# Patient Record
Sex: MALE | Race: AMERICAN INDIAN OR ALASKA NATIVE | Employment: UNEMPLOYED | URBAN - METROPOLITAN AREA
[De-identification: names, ages, dates, MRNs, and addresses within clinical notes are randomized per-mention and may not be internally consistent; named-entity substitution may affect disease eponyms.]

---

## 2021-01-24 ENCOUNTER — TRANSFERRED RECORDS (OUTPATIENT)
Dept: HEALTH INFORMATION MANAGEMENT | Facility: CLINIC | Age: 21
End: 2021-01-24

## 2021-01-25 ENCOUNTER — HOSPITAL ENCOUNTER (INPATIENT)
Facility: HOSPITAL | Age: 21
LOS: 3 days | Discharge: HOME | End: 2021-01-28
Attending: PSYCHIATRY & NEUROLOGY | Admitting: PSYCHIATRY & NEUROLOGY
Payer: MEDICAID

## 2021-01-25 ENCOUNTER — TELEPHONE (OUTPATIENT)
Dept: BEHAVIORAL HEALTH | Facility: CLINIC | Age: 21
End: 2021-01-25

## 2021-01-25 PROBLEM — T14.91XA SUICIDE ATTEMPT (H): Status: ACTIVE | Noted: 2021-01-25

## 2021-01-25 PROCEDURE — 124N000001 HC R&B MH

## 2021-01-25 RX ORDER — MAGNESIUM HYDROXIDE/ALUMINUM HYDROXICE/SIMETHICONE 120; 1200; 1200 MG/30ML; MG/30ML; MG/30ML
30 SUSPENSION ORAL EVERY 4 HOURS PRN
Status: DISCONTINUED | OUTPATIENT
Start: 2021-01-25 | End: 2021-01-26

## 2021-01-25 RX ORDER — ACETAMINOPHEN 325 MG/1
650 TABLET ORAL EVERY 4 HOURS PRN
Status: DISCONTINUED | OUTPATIENT
Start: 2021-01-25 | End: 2021-01-28 | Stop reason: HOSPADM

## 2021-01-25 RX ORDER — POLYETHYLENE GLYCOL 3350 17 G/17G
17 POWDER, FOR SOLUTION ORAL DAILY PRN
Status: DISCONTINUED | OUTPATIENT
Start: 2021-01-25 | End: 2021-01-28 | Stop reason: HOSPADM

## 2021-01-25 RX ORDER — ARIPIPRAZOLE 5 MG/1
5 TABLET ORAL DAILY
Status: ON HOLD | COMMUNITY
Start: 2021-01-04 | End: 2021-01-28

## 2021-01-25 ASSESSMENT — ACTIVITIES OF DAILY LIVING (ADL)
ORAL_HYGIENE: INDEPENDENT
DRESS: SCRUBS (BEHAVIORAL HEALTH)
HYGIENE/GROOMING: INDEPENDENT

## 2021-01-25 ASSESSMENT — MIFFLIN-ST. JEOR: SCORE: 1685.76

## 2021-01-25 NOTE — TELEPHONE ENCOUNTER
"S: 21 y/o male presented to the [facility] ED after a suicide attempt.    B: Hx MDD, anxiety, SIB.  Pt ingested a \"handful\" of Prozac and Abilify as a suicide attempt.  Hx of IP MH admission x2 at [facility] in [place] due to suicide attempts.  Last suicide attempt sometime within the past 6 months.   Pt reported auditory hallucinations but their nature is unclear.   reported pt became irritable when he was asked to elaborate.  No reported HI or recent cutting.  Admits to using marijuana but denied other substances.  Per assessment, pt's guardian/foster mother lives in [place].  Pt recently moved to the [facility] to live with his (bio) mother who has kicked him out only to ask him to return on multiple occasions.      A: 72 hr hold.  DEC Assessment states pt has a legal guardian/foster mother.  Hillary Miranda.   reported pt has been medically cleared.  Poison Control recommended IV fluids and observation.  Labs have not been received.    R: DEC Assessment received at 0412.  Awaiting additional clinical, hold and labs from [facility].  "

## 2021-01-25 NOTE — TELEPHONE ENCOUNTER
"S: 21 y/o male presented to the [facility] ED after a suicide attempt.  B: Hx MDD, anxiety, SIB.  Pt ingested a \"handful\" of Prozac and Abilify as a suicide attempt.  Hx of IP MH admission x2 at [facility] in [place] due to suicide attempts.  Last suicide attempt sometime within the past 6 months.   Pt reported auditory hallucinations but their nature is unclear.   reported pt became irritable when he was asked to elaborate.  No reported HI or recent cutting.  Admits to using marijuana but denied other substances.  Per assessment, pt's guardian/foster mother lives in [place].  Pt recently moved to the [facility] to live with his (bio) mother who has kicked him out only to ask him to return on multiple occasions.    A: 72 hr hold.  DEC Assessment states pt has a legal guardian/foster mother.  Hillary Miranda.  Patient cleared and ready for behavioral bed placement: Yes   reported pt has been medically cleared.  Poison Control cleared.  Covid negative.  R:  [place] [facility] 5N/Cali Alanis/Branden  "

## 2021-01-26 PROCEDURE — 124N000001 HC R&B MH

## 2021-01-26 PROCEDURE — 99222 1ST HOSP IP/OBS MODERATE 55: CPT | Mod: 95 | Performed by: NURSE PRACTITIONER

## 2021-01-26 PROCEDURE — 250N000013 HC RX MED GY IP 250 OP 250 PS 637: Performed by: NURSE PRACTITIONER

## 2021-01-26 RX ADMIN — BICTEGRAVIR SODIUM, EMTRICITABINE, AND TENOFOVIR ALAFENAMIDE FUMARATE 1 TABLET: 50; 200; 25 TABLET ORAL at 11:28

## 2021-01-26 ASSESSMENT — ACTIVITIES OF DAILY LIVING (ADL)
HYGIENE/GROOMING: INDEPENDENT
DRESS: SCRUBS (BEHAVIORAL HEALTH)
ORAL_HYGIENE: INDEPENDENT

## 2021-01-26 NOTE — PROGRESS NOTES
01/25/21 2108   Patient Belongings   Did you bring any home meds/supplements to the hospital?  No   Patient Belongings locker;sent to security per site process   Patient Belongings Put in Hospital Secure Location (Security or Locker, etc.) cash/credit card;cell phone/electronics;clothing;wallet;shoes   Belongings Search Yes   Clothing Search Yes   Second Staff princess Drake jeans, white shirt, black jacket, black shoes, [place] ID, 2 mastercard debit, 2 visa debit, social security card,[place] ID, orange scrubs, black samsung cell phone   List items sent to safe: Samsung cell phone no cracks, social security card, [place] ID, 2 mastercard debit, 2 visa debit, [place] ID    All other belongings put in assigned cubby in belongings room.       I have reviewed my belongings list on admission and verify that it is correct.     Patient signature_______________________________    Second staff witness (if patient unable to sign) ______________________________       I have received all my belongings at discharge.    Patient signature________________________________    Yina  1/25/2021  9:10 PM

## 2021-01-26 NOTE — H&P
"Psychiatric Eval/H&P  Patient Name: Rajiv Covington   YOB: 2000  Age: 20 year old  [id number]    Primary Physician: No Ref-Primary, Physician   Completed By: Cali Alanis NP     CC:  Suicide attempt    HPI  Rajiv Covington is a 20 year old who was admitted via [facility] ED after ingesting a handful of Prozac and Abilify with the intent to commit suicide. Previous history of two inpatient admissions for suicide attempt at [facility] in [place]. Also reported auditory hallucinations and presented as irritable and agitated at times.  It appears he may have a guardian, his foster mother, who resides in [place]. Per ED report, Recently moved to the [facility] to reside with his biological mother, who has kicked him out multiple times, only to then ask him to return.     Rajiv denied that the ingestion of Prozac and Abilify were an attempt to end his life. He stated, \"why do you say overdose if I'm still here?\" When asked about his intent, he replied, \"To get rid of them.\" When this provider began to inquire about his mental health history, he became fairly agitated and said, \"Evans Zendejas, I already told everyone this.\"   His cooperation did not improve. He reported that he has been hospitalized \"once\" in the past but would not say where or when. He did indicate that he does not wish to take the Prozac or Abilify, and refused offers to start anything else. He would like the Biktarvy resumed. I did check with the pharmacy, and this is available.     It should be noted that Rajiv denied having a guardian, suggesting that this was a previous situation in when he was in a foster situation.       [facility]     Past Psychiatric History:   Per records he has had two previous inpatient hospitalizations at [facility] in [place] following suicide attempts. Current medications include Abilify and Prozac. Diagnosis of Schizoaffective Disorder. There isn't much history available regarding any " "previous medications. Outpatient services appear limited to primary care and infectious disease specialists.      Social History:   Unclear. Was recently homeless and/or struggling with housing instability. Was previously in foster care. Has lived on and off with his birth mother.      Chemical Use History:   Positive for amphetamines and THC, though he denied use in the ED. Records clearly indicate that he is an active methamphetamine and THC user, smoking and IV use. He has been set up with a clean needle program and provided a needle calender and education on how to use.      Family Psychiatric History:   Unknown       MSE/PSYCH  PSYCHIATRIC EXAM  /67   Pulse 74   Temp 98.3  F (36.8  C) (Temporal)   Resp 14   Ht 1.778 m (5' 10\")   Wt 67 kg (147 lb 9.6 oz)   SpO2 97%   BMI 21.18 kg/m    -Appearance/Behavior: Awake, alert.    -Attitude:guarded , uncooprative  -Motor: normal or unremarkable.  -Gait: Normal.    -Abnormal involuntary movements: None noted.  -Mood: agitated.  -Affect: Blunted/Flat.  -Speech: Clear but quiet                -Thought process/associations: Remained linear - some responses illogical  -Thought content: Seemingly normal - difficult to assess  -Perceptual disturbances: Unknown - not cooperative              -Suicidal/Homicidal Ideation: Denied  -Judgment: Limited.  -Insight: Limited.  *Orientation: time, place and person.  *Memory: Difficult to assess, though claims of \"I can't remember,\" appear to be more related to being guarded than memory issues   *Attention: Inadequate   *Language: fluent, no aphasias, able to repeat phrases and name objects. Vocab intact.  *Fund of information:  not assessed.  *Cognitive functioning estimate: unable to assess          Medical Review of Systems:   Medical History and ROS  Prior to Admission medications    Medication Sig Start Date End Date Taking? Authorizing Provider   ARIPiprazole (ABILIFY) 5 MG tablet Take 5 mg by mouth 1/4/21  Yes " "Reported, Patient   bictegravir-emtricitabine-tenofovir (BIKTARVY) -25 MG per tablet Take 1 tablet by mouth 1/4/21  Yes Reported, Patient   FLUoxetine (PROZAC) 20 MG capsule Take 20 mg by mouth 1/4/21  Yes Reported, Patient     No Known Allergies  No past medical history on file.  No past surgical history on file.      Physical Exam - visual only. Vitals reviewed.     Constitutional: appears well-developed and well-nourished.   HENT:   Head: Normocephalic and atraumatic.   Right Ear: External ear normal.   Left Ear: External ear normal.   Nose: Nose normal.   Mouth/Throat: External oral area intact   Eyes: Conjunctivae and EOM are normal.   Cardiovascular: Normal rate  Pulmonary/Chest: Effort normal. No respiratory distress.     Skin: Dry, intact    Review of Systems:  Offered no complaints. Uncooperative with assessment     Labs:   Preadmission labs reviewed with no acute concerns.      Assessment/Impression: This is a 20 year old yo male with a history of Schizoaffective disorder who reportedly overdosed on Prozac and abilify with the intent to die. He does deny this was a suicide attempted and reported that he was trying to \"get rid\" of the medications. He is not interested in resuming these medications, nor cooperating with an assessment. Minimal history available in his chart regarding his mental health history. It was reported, though not documented, that he was given Ketamine and held down in order to collect a urine. Up until this point, he had reportedly been cooperative. His lack of cooperation and agitated mood today are not totally unexpected. Will re-approach him tomorrow.     DX:  Schizoaffective Disorder, bipolar type - per history    Plan:  Admit to Unit: [address] [place] [address]  Attending: CIARAN Handley, CNP  Patient is: 72 hour mental health hold  Monitor for SI  Provide a safe environment and therapeutic milieu.   Continue Biktarvy    Anticipated length of stay: 3-5 days     CIARAN Handley, " CNP  Video-Visit Details    Type of service:  Video Visit    Video Start Time (time video started): 10:39     Video End Time (time video stopped): 10:55    Originating Location (pt. Location): Other [facility]    Distant Location (provider location):  [facility]     Mode of Communication:  Video Conference via [facility]    Physician has received verbal consent for a Video Visit from the patient? Yes      Cali Alanis, NP

## 2021-01-26 NOTE — PLAN OF CARE
" ADMISSION NOTE    Reason for admission SI.  Safety concerns no.  Risk for or history of violence no know hx.   Full skin assessment: yes-skin WDL.    Patient arrived on unit from [facility] accompanied by  on 1/25/2021  8:53 PM.   Status on arrival: A&O, denies pain. Withdrawn, quiet flat affect . Very soft spoken-low volume-difficult to hear.   Poor eye contact looks at floor with questioning. Denies being on any medications then states he is on Abilify 5 mg daily and Prozac dose unknown.  States he does not know what pharmacy he has filled at. Smokes half patch cigarettes daily-denies need for crystal patch.   Denies previous suicide attempt. States he started having thoughts of suicide while in high school-no plan. States he had weekly thoughts of wanting to die.   Declines signing LUZ. States his mother is his guardian and she kicks him out frequently.    /93   Pulse 92   Temp 97.7  F (36.5  C) (Temporal)   Resp 18   Ht 1.778 m (5' 10\")   Wt 67 kg (147 lb 9.6 oz)   SpO2 98%   BMI 21.18 kg/m    Patient given tour of unit and Welcome to  unit papers given to patient, wanding completed, belongings inventoried, and admission assessment completed.   Patient's legal status on arrival is 72 hour hold. Appropriate legal rights discussed with and copy given to patient. Patient Bill of Rights discussed with and copy given to patient.   Patient denies SI, HI, and thoughts of self harm and contracts for safety while on unit.      Maria Elena Conley RN  1/25/2021  9:42 PM    Problem: Behavioral Health Plan of Care  Goal: Patient-Specific Goal (Individualization)  Description: Cooperate with treatment team recommendations including medications.   Sleep at least 6 hours nightly.  Eat 75% meals.   Attend at least 75% of programming.   Outcome: No Change  Note:        Problem: Suicide Risk  Goal: Absence of Self-Harm  Outcome: No Change    Face to face end of shift report communicated to *oncoming " shift.     Maria Elena Conley RN  1/25/2021  9:49 PM

## 2021-01-26 NOTE — PLAN OF CARE
Social Service Psychosocial Assessment  Presenting Problem:  Per notes, pt presented to the [facility] ED with a SA of after taking a handful of Prozac and Abilify.  Pt declined to talk to this writer for this assessment. Collateral was obtained from ED notes in chart, DEC assessment, and current admission notes.   Marital Status:  Single  Spouse / Children:  Unknown  Psychiatric TX HX:  Has a history of hospitalizations at [facility] in [place], [place]. Diagnoses history of Depression and Anxiety.  Suicide Risk Assessment:  Pt presented to the ED after taking a handful of Prozac and Abilify- pt admits that this was a SA. Has a history of SI, SA, and SIB. Unknown if experiencing current SI-as pt denied to do assessment with this writer.  Access to L[employer] (explain):  Unknown  Family Psych HX:  Mother- significant MH history   A & Ox:  x4  Medication Adherence:  See H&P  Medical Issues:  See H&P  Visual -Motor Functioning:  Good  Communication Skills /Needs:  Good  Ethnicity:    or      Spirituality/Druze Affiliation: Unknown    Clergy Request:   No   History:  No  Living Situation:  Recently moved from [place] to the [facility] to live with his mother.   ADL s:  Independent   Education:  Graduated HS  Financial Situation:    Occupation:  Unemployed   Leisure & Recreation: Unknown  Childhood History: Grew up in [place].   Trauma Abuse HX:  Unknown  Relationship / Sexuality:  Unknown  Substance Use/ Abuse:  Utox positive for amphetamines and THC.  Chemical Dependency Treatment HX:  Unknown  Legal Issues:  Unknown  Significant Life Events:  Unknown  Strengths:  Currently in a safe environment, has healthy supports  Challenges /Limitation:  Current SA, CD abuse  Patient Support Contact (Include name, relationship, number, and summary of conversation):  Pt has not signed any LUZ's at this time. DEC assessment state for pt to have a legal guardian/ in  OMAR Lopez -Hillary Miranda  [phone]  Interventions:     Vulnerable Adult/Child Report- No    Community-Based Programs-No    Medical/Dental Care- PCP- None    Home Care- No    CD Evaluation/Rule 25/Aftercare- Unknown    Medication Management- Unknown    Individual Therapy- Unknown    Clergy Request- No    Housing/Placement- Living on and off w/ mom    Case Management- No    Insurance Coverage- No insurance listed     Financial Assistance- Unknown    Commit/[place] Screening- Unknown    Suicide Risk Assessment-  Pt presented to the ED after taking a handful of Prozac and Abilify- pt admits that this was a SA. Has a history of SI, SA, and SIB. Unknown if experiencing current SI-as pt denied to do assessment with this writer.    High Risk Safety Plan- Talk to supports; Call crisis lines; Go to local ER if feeling suicidal.  JEROD Johnston, MOISESSW  1/26/2021  11:45 AM

## 2021-01-26 NOTE — PLAN OF CARE
Face to face end of shift report will be communicated to oncoming RN.     Problem: Behavioral Health Plan of Care  Goal: Patient-Specific Goal (Individualization)  Description: Cooperate with treatment team recommendations including medications.   Sleep at least 6 hours nightly.  Eat 75% meals.   Attend at least 75% of programming.   Outcome: No Change     Problem: Suicide Risk  Goal: Absence of Self-Harm  Outcome: No Change  Face to face end of shift report obtained from SCARLET Arredondo. Patient observed resting in bed.    Pt appears to be sleeping in bed with eyes closed, having regular respirations, and position changes. 15 minutes and PRN safety checks completed with no noted complains. No self harm noted or reported so far this shift.   0600- Pt appeared to have slept all night.

## 2021-01-26 NOTE — PLAN OF CARE
Problem: Behavioral Health Plan of Care  Goal: Patient-Specific Goal (Individualization)  Description: Cooperate with treatment team recommendations including medications.   Sleep at least 6 hours nightly.  Eat 75% meals.   Attend at least 75% of programming.   Outcome: No Change  Note:   A&O, VSS, denies pain. Denies all criteria including: SI, SIB, HI or hallucinations.  Isolative and withdrawn with flat sad affect. Stays in room entire shift. Difficult to converse with-- pt pulls blankets over head, then turns over with back to this writer. Nods head Y/N to assessment questions.    Eats dinner when tray brought to him.     Problem: Suicide Risk  Goal: Absence of Self-Harm  Outcome: No Change     Problem: Suicidal Behavior  Goal: Suicidal Behavior is Absent or Managed  Outcome: No Change     Face to face end of shift report communicated to oncoming shift.     Maria Elena Conley RN  1/26/2021  5:37 PM

## 2021-01-26 NOTE — PLAN OF CARE
Prior to Admission Medication Reconciliation:     Medications added:   [x] None  [] As listed below:    Medications deleted:   [x] None  [] As listed below:    Changes made to existing medications:   [x] None  [] As listed below:      Last times/dates taken verified with patient:  [] Yes- completed myself  [x] Nurse sukumar gomez completing. No isssues.   [] Unable to review with patient:  [] Nurse completed/changes made:     Allergy modifications:    [x]Did not review  []Patient verified NKA  []Patient verified current existing allergies: no changes made  []New allergies: listed below    Medication reconciliation sources:   []Patient  []Patient family member/emergency contact: **  [][facility] Review  []Epic Chart Review  [x]Care Everywhere review:  Medication Sig Dispensed Refills Start Date End Date Status   bictegravir-emtricitab-tenofovir ALAFENAMIDE (BIKTARVY) -25 mg oral tablet   Take 1 tablet by mouth daily. 30 tablet   3 01/04/2021   Active   ARIPiprazole (ABILIFY) 5 mg oral tablet   Take 1 tablet (5 mg) by mouth daily. 30 tablet   1 01/04/2021   Active   FLUoxetine (PROZAC) 20 mg oral capsule   Take 1 capsule (20 mg) by mouth every morning. 30 capsule   1 01/04/2021   Active   []Pharmacy med list: **  [x]Pharmacy phone call: [facility] 30 days supply dispensed 1/4/21 of all three    Abilify: filled 1/4/21    Prozac: filled 1/4/21     Biktarvy: filled 1/4/21  []Outside meds dispense report  []Nursing home or Assisted Living MAR:  []Other: **    Pharmacy desired at discharge: [facility] is pharmacy pt uses    Is patient on coumadin?  [x]No      Requests for consultation by provider or pharmacist:   [] Patient understands why all of their meds were prescribed and how to take them. No questions.   [x] Fill dates coincide with compliancy for all maintenance meds.   [] Fill dates do not coincide with compliancy with maintenance meds. See notes in PTA medlist about how patient is taking.   [] Patient has questions  about the following:    Comments: nurse will speak with patient regarding last times taken. No issues with medlist.     Yuli Carrasquillo on 1/26/2021 at 10:58 AM       Discrepancies: [x] No []Not Applicable []Yes: listed below    Time spent on medication reconciliation:   []5-20 mins  [x]20-40 mins  []> 40 mins    Issues completing PTA medication reconciliation:  [] On hold for a long time  [] Waited for a call back  [] Fax didn't come through  [] Fax took a long time  [] Other:    Notifying appropriate party of changes/additions/discrepancies:  []No pertinent changes made, notification not necessary.   [] Notified attending provider via text page  [] Notified attending provider in person  [] Notified pharmacy  [x] Notified nurse  [] Attending provider not available, left detailed notes  [] Changes/additions made don't need provider notification because provider has not seen patient or input any orders  [] Changes/additions made don't need provider notification because changes made are to medications not ordered  Medications Prior to Admission   Medication Sig Dispense Refill Last Dose     ARIPiprazole (ABILIFY) 5 MG tablet Take 5 mg by mouth daily         bictegravir-emtricitabine-tenofovir (BIKTARVY) -25 MG per tablet Take 1 tablet by mouth daily         FLUoxetine (PROZAC) 20 MG capsule Take 20 mg by mouth daily

## 2021-01-26 NOTE — PLAN OF CARE
"Problem: Behavioral Health Plan of Care  Goal: Patient-Specific Goal (Individualization)  Description: Cooperate with treatment team recommendations including medications.   Sleep at least 6 hours nightly.  Eat 75% meals.   Attend at least 75% of programming.   Outcome: Improving  He declined to come to the lounge for breakfast. He did eat 50% after staff brought it to his room. He mentions that he has had one previous mental health inpatient admission. He is soft spoken and irritated that he needs to repeat his history to another provider. \"I haven't been feeling suicidal\",  \"why do you say overdose if I'm still here\", He said the overdose was \"to get rid of the pills\".  He doesn't offer information or engage in conversation. He is isolating in his room. He did not attend group.   He has remained in his room all day.      Problem: Suicide Risk  Goal: Absence of Self-Harm  Outcome: Improving  He denies feeling suicidal. \"I haven't been feeling suicidal\". He is isolative.     Face to face end of shift report to be communicated to evening shift RN.     Ilya Potts RN  1/26/2021  1:58 PM             "

## 2021-01-26 NOTE — PLAN OF CARE
"Called contact number listed in DEC for guardian \"Hillary\". Left a message with Hillary's  to call this writer back and gave them fax number to send proof of guardianship.   "

## 2021-01-27 PROCEDURE — 99231 SBSQ HOSP IP/OBS SF/LOW 25: CPT | Mod: 95 | Performed by: NURSE PRACTITIONER

## 2021-01-27 PROCEDURE — 250N000013 HC RX MED GY IP 250 OP 250 PS 637: Performed by: NURSE PRACTITIONER

## 2021-01-27 PROCEDURE — 124N000001 HC R&B MH

## 2021-01-27 RX ADMIN — BICTEGRAVIR SODIUM, EMTRICITABINE, AND TENOFOVIR ALAFENAMIDE FUMARATE 1 TABLET: 50; 200; 25 TABLET ORAL at 08:53

## 2021-01-27 NOTE — PLAN OF CARE
Talked with pt. He states he would like to leave tomorrow and would need a ride arranged back to [place]. Let pt know that I would talk with the treatment team and NP to confirm a discharge. Pt states he will talk to nurse to sign to be here voluntary after hold is up. Pt denies wanting any services arranged and states he already is working himself on getting one's in place. Let pt know that I would put resources in his AVS.    Called guardian again. Spoke with her  and he stated that they were having trouble with sending the pw. Gave them my work e-mail for an alternative way to contact versus fax. Told them I could give more information about pt once guardianship pw was sent.  confirms again that his wife is the guardian for pt. Although will still continue to wait for pw for this verification.

## 2021-01-27 NOTE — DISCHARGE INSTRUCTIONS
Behavioral Discharge Planning and Instructions    Summary: Pt admitted with SI    Main Diagnosis:   Schizoaffective Disorder, bipolar type - per history    Major Treatments, Procedures and Findings: Stabilize with medications, connect with community programs.    Symptoms to Report: feeling more aggressive, increased confusion, losing more sleep, mood getting worse or thoughts of suicide    Lifestyle Adjustment: Take all medications as prescribed, meet with doctor/ medication provider, out patient therapist, , and [facility] worker as scheduled. Abstain from alcohol or any unprescribed drugs.    Psychiatry Follow-up:     Nidhi Miranda - [phone]    [facility]  Medication Management & Therapy-As arranged  [address] [facility] [address]  [place] [address]  P: [phone]     [facility]- Urgent Care for adult mental health  24/7 Mobile Crisis Team and Phone Support also  [address] [place] [address]  [phone]    [facility] adult mental health crisis unit- [facility]  [phone]    [facility] crisis unit  [phone]    [facility] crisis assistance  [facility]- [phone]    [facility]  [phone]  [facility]  [phone]  [facility]  [phone]  [facility]  [phone]      Resources:   Crisis Intervention: 142.948.5611 or 366-461-8287 (TTY: 395.623.1936).  Call anytime for help.  [employer] [place] on Mental Illness (www.[place].jama.org): 132.370.5023 or 954-969-1257.  Alcoholics Anonymous (www.alcoholics-anonymous.org): Check your phone book for your local chapter.  Suicide Awareness Voices of Education (SAVE) (www.save.org): 837-472-UKYJ (3748)  National Suicide Prevention Line (www.mentalhealthmn.org): 080-985-VPFB (4891)  Mental Health Consumer/Survivor Network of [place] (www.mhcsn.net): 220.466.4997 or 463-780-4249  Mental Health Association of [place] (www.mentalhealth.org): 822.193.2322  "or 340-505-3014    General Medication Instructions:   See your medication sheet(s) for instructions.   Take all medicines as directed.  Make no changes unless your doctor suggests them.   Go to all your doctor visits.  Be sure to have all your required lab tests. This way, your medicines can be refilled on time.  Do not use any drugs not prescribed by your doctor.  Avoid alcohol.    Crisis Resources     [place] Area:  [facility], Crisis stabilization [facility]- [phone]  [facility] Crisis Line: 1-629.775.5916  [facility] Family [facility]: 740-3445  Sexual Assault Program [facility]: 655.224.5271 or 1-982.874.3194  [facility]: 1-346.714.5551 Ext: 279       Calls answered Mon-Fri-8:00 am--4:30 pm    [place]:  [facility]: 5-862-766-7480  [facility] - 8-526-937-8563  [facility] first call for help: 6-186-421-3751  [facility] Counsel[facility] Crisis Center:  ([phone] 850-5257    [place] Area:  Warm Line: 1-213.298.3006       Calls answered Tuesday--Saturday 4:00 pm--10:00 pm  [place] Crisis Line - [phone]  [facility] 087-151-1821    [place] Statewide:  [place] Crisis and Referral Services: 4-436-060-8304  National Suicide Prevention Lifeline: 3-390-498-TALK (5535)   - nvb0biwq- Text \"Life\" to 95938  First Call for Help: 2-1-1  [facility] Helpline- 8-768-MOSC-HELP   Crisis Text Line: Text  [place]  to 484620  "

## 2021-01-27 NOTE — PROGRESS NOTES
"[facility]  Psychiatric Progress Note      Impression:     Rajiv Covington is a 20 year old who was admitted via [place] ED after ingesting a handful of Prozac and Abilify with the intent to commit suicide. Previous history of two inpatient admissions for suicide attempt at [facility] in [place]. Also reported auditory hallucinations and presented as irritable and agitated at times.  It appears he may have a guardian, his foster mother, who resides in [place]. Per ED report, Recently moved to the [facility] to reside with his biological mother, who has kicked him out multiple times, only to then ask him to return.     Would only answer questions by nodding and shaking head. Denied questions or concerned. Denied SI/HI. Denied depression and did quietly answer, \"pretty decent,\" when asked about the quality of his mood. He did confirm that part of his admission process included being injected with Ketamine and cathed to obtain urine. It sounds like he went in for help and was cooperative up until they requested a urine. He is not interested in medications, nor is he interested in any assistance with setting up chemical dependency or mental health supports.     Nursing did report that he spoke to his aunt last night and is considering going to reside with her. We are still trying to determine if he actually has a guardian or not. So far the  has been unable to contact the person listed.          Diagnoses:     Schizoaffective Disorder, bipolar type - per history    Attestation:  Patient has been seen and evaluated by me,  Cali Alanis NP          Interim History:   The patient's care was discussed with the treatment team and chart notes were reviewed.    BEHAVIORAL TEAM DISCUSSION    Progress: Remains calm and without behaviors. Still guarded and difficult to obtain information from.   Continued Stay Criteria/Rationale: Recent overdose  Medical/Physical: Nothing acute   Precautions:   Falls " precaution?: No  Behavioral Orders   Procedures     Code 1 - Restrict to Unit     Routine Programming     As clinically indicated     Status 15     Every 15 minutes.       Plan:   No changes today.  Continue to attempt contact of listed guardian.  Assist with transportation and possible placement with aunt if this is what he chooses to do.     Rationale for change in precautions or plan:   No changes. Does not want medications or any other supportive services.       Participants: Cali Alanis, APRN, CNP, Nursing          Medications:   I have reviewed this patient's current medications    Prescription Medications as of 1/27/2021       Rx Number Disp Refills Start End Last Dispensed Date Next Fill Date Owning Pharmacy    ARIPiprazole (ABILIFY) 5 MG tablet    1/4/2021        Sig: Take 5 mg by mouth daily     Class: Historical    Route: Oral    bictegravir-emtricitabine-tenofovir (BIKTARVY) -25 MG per tablet    1/4/2021        Sig: Take 1 tablet by mouth daily     Class: Historical    Route: Oral    FLUoxetine (PROZAC) 20 MG capsule    1/4/2021        Sig: Take 20 mg by mouth daily     Class: Historical    Route: Oral      Hospital Medications as of 1/27/2021       Dose Frequency Start End    acetaminophen (TYLENOL) tablet 650 mg 650 mg EVERY 4 HOURS PRN 1/25/2021     Admin Instructions: Do not use if the patient has significant liver disease. MAX acetaminophen = 4000 mg/24 hrs.  MAX acetaminophen LESS than 3000 mg/24 hrs for patients GREATER than or EQUAL to 65 years old.<BR>Maximum acetaminophen dose from all sources = 75 mg/kg/day not to exceed 4 grams/day.    Class: E-Prescribe    Route: Oral    bictegravir-emtricitabine-tenofovir (BIKTARVY) -25 MG per tablet 1 tablet 1 tablet DAILY 1/26/2021     Admin Instructions: May be taken with or without food.    Class: E-Prescribe    Route: Oral    nicotine (NICORETTE) gum 2 mg 2 mg EVERY 1 HOUR PRN 1/25/2021     Admin Instructions: Chew until tingling, then  "place between cheek and gum.  <BR>Repeat.  <BR>Do not swallow.  <BR>Not to exceed 48 mg in a 24 hour time period.    Class: E-Prescribe    Route: Buccal    polyethylene glycol (MIRALAX) Packet 17 g 17 g DAILY PRN 1/25/2021     Admin Instructions: 1 Packet = 17 grams. Mix each gram with at least 1/2 ounce (15 mL) of water - <BR>8 ounces for 17 g dose, 4 ounces for 8.5 g dose, 2 ounces for 4 g dose.<BR>Follow with the same volume of water.<BR>Hold for loose stools.<BR>    Class: E-Prescribe    Route: Oral               10 point ROS negative        Allergies:   No Known Allergies         Psychiatric Examination:   /72   Pulse 88   Temp 98.4  F (36.9  C) (Temporal)   Resp 16   Ht 1.778 m (5' 10\")   Wt 67 kg (147 lb 9.6 oz)   SpO2 98%   BMI 21.18 kg/m    Weight is 147 lbs 9.6 oz  Body mass index is 21.18 kg/m .    Appearance:  awake, alert and adequately groomed  Attitude:  somewhat cooperative  Eye Contact:  fair  Mood:  \"decent\"  Affect:  intensity is blunted  Speech: minimal - answered on question verbally, very soft speech  Psychomotor Behavior:  no evidence of tardive dyskinesia, dystonia, or tics  Thought Process:  logical, linear and goal oriented  Associations:  no loose associations  Thought Content:  no evidence of suicidal ideation or homicidal ideation and no evidence of psychotic thought  Insight:  limited  Judgment:  limited  Oriented to:  time, person, and place  Attention Span and Concentration:  limited  Recent and Remote Memory:  Seemingly intact   Fund of Knowledge: not assessed  Muscle Strength and Tone: normal  Gait and Station: Normal           Labs:   No labs today    Video-Visit Details    Type of service:  Video Visit    Video Start Time (time video started):1029    Video End Time (time video stopped): 1035    Originating Location (pt. Location): Other [facility]    Distant Location (provider location):  Remote    Mode of Communication:  Video Conference via [employer]    Physician has " received verbal consent for a Video Visit from the patient? Yes      Cali Alanis, NP

## 2021-01-27 NOTE — PLAN OF CARE
Problem: Behavioral Health Plan of Care  Goal: Patient-Specific Goal (Individualization)  Description: Cooperate with treatment team recommendations including medications.   Sleep at least 6 hours nightly.  Eat 75% meals.   Attend at least 75% of programming.   1/27/2021 0907 by Kolby Paiz RN  Outcome: Improving     Problem: Behavioral Health Plan of Care  Goal: Patient-Specific Goal (Individualization)  Description: Cooperate with treatment team recommendations including medications.   Sleep at least 6 hours nightly.  Eat 75% meals.   Attend at least 75% of programming.   1/27/2021 0907 by Kolby Paiz RN  Outcome: Improving     Problem: Suicide Risk  Goal: Absence of Self-Harm  Outcome: Improving     Problem: Suicidal Behavior  Goal: Suicidal Behavior is Absent or Managed  Outcome: Improving     Face to face shift report received from RN. Rounding completed, pt observed. Client ate 50% of breakfast. He was compliant with AM medication. Client has remained in his room for most of morning but did ambulate hallway for a brief time. He is guarded and isolative at this time, engaging in little conversation with this recorder. He denies any suicidal ideation .Client has opted to not attend unit programming this morning.Client sat in dining area and ate lunch. He was not noted to interact with peers during this time. Face to face report will be communicated to oncoming RN.    Kolby Paiz RN  1/27/2021  10:34 AM

## 2021-01-27 NOTE — PLAN OF CARE
Face to face end of shift report will be communicated to oncoming RN.     Problem: Behavioral Health Plan of Care  Goal: Patient-Specific Goal (Individualization)  Description: Cooperate with treatment team recommendations including medications.   Sleep at least 6 hours nightly.  Eat 75% meals.   Attend at least 75% of programming.   Outcome: No Change     Problem: Suicide Risk  Goal: Absence of Self-Harm  Outcome: No Change     Problem: Suicidal Behavior  Goal: Suicidal Behavior is Absent or Managed  Outcome: No Change  Face to face end of shift report obtained from SCARLET Arredondo. Patient observed resting in bed.   Pt appears to be sleeping in bed with eyes closed, having regular respirations, and position changes. 15 minutes and PRN safety checks completed with no noted complains. No self harm noted or reported so far this shift.   0600- Pt appeared to have slept all night.

## 2021-01-28 VITALS
HEIGHT: 70 IN | RESPIRATION RATE: 16 BRPM | OXYGEN SATURATION: 96 % | BODY MASS INDEX: 21.13 KG/M2 | DIASTOLIC BLOOD PRESSURE: 67 MMHG | TEMPERATURE: 97.7 F | HEART RATE: 90 BPM | SYSTOLIC BLOOD PRESSURE: 108 MMHG | WEIGHT: 147.6 LBS

## 2021-01-28 PROCEDURE — 250N000013 HC RX MED GY IP 250 OP 250 PS 637: Performed by: NURSE PRACTITIONER

## 2021-01-28 PROCEDURE — 99239 HOSP IP/OBS DSCHRG MGMT >30: CPT | Performed by: NURSE PRACTITIONER

## 2021-01-28 RX ADMIN — BICTEGRAVIR SODIUM, EMTRICITABINE, AND TENOFOVIR ALAFENAMIDE FUMARATE 1 TABLET: 50; 200; 25 TABLET ORAL at 08:53

## 2021-01-28 ASSESSMENT — ACTIVITIES OF DAILY LIVING (ADL)
ORAL_HYGIENE: INDEPENDENT
HYGIENE/GROOMING: INDEPENDENT
DRESS: SCRUBS (BEHAVIORAL HEALTH)

## 2021-01-28 NOTE — DISCHARGE SUMMARY
"     Psychiatric Discharge Summary    Rajiv Covington MRN# [id number]   Age: 20 year old YOB: 2000     Date of Admission:  1/25/2021  Date of Discharge:  1/28/2021  Admitting Physician:  Jony Otero MD  Discharge Provider:  CIARAN Young CNP          Event Leading to Hospitalization and Hospital Stay   HPI  Rajiv Covington is a 20 year old who was admitted via [place] ED after ingesting a handful of Prozac and Abilify with the intent to commit suicide. Previous history of two inpatient admissions for suicide attempt at [facility] in [place]. Also reported auditory hallucinations and presented as irritable and agitated at times.  It appears he may have a guardian, his foster mother, who resides in [place]. Per ED report, Recently moved to the [facility] to reside with his biological mother, who has kicked him out multiple times, only to then ask him to return.      Rajiv denied that the ingestion of Prozac and Abilify were an attempt to end his life. He stated, \"why do you say overdose if I'm still here?\" When asked about his intent, he replied, \"To get rid of them.\" When this provider began to inquire about his mental health history, he became fairly agitated and said, \"Evans Zendejas, I already told everyone this.\"     His cooperation did not improve. He reported that he has been hospitalized \"once\" in the past but would not say where or when. He did indicate that he does not wish to take the Prozac or Abilify, and refused offers to start anything else. He would like the Biktarvy resumed. I did check with the pharmacy, and this is available.      It should be noted that Rajiv denied having a guardian, suggesting that this was a previous situation in when he was in a foster situation.     I checked in with Rajiv this morning before lunch and indicates he is eager for discharge. He remains guarded and dismissive, but otherwise denies any mood issues, suicidal/homicidal " "ideation, hallucinations or delusions, and indicated he is sleeping and eating well.              Stay:  Admitted to unit on a 72 hour hold. Provided a safe environment and therapeutic milieu. Encouraged participation in unit activities. Rajiv remained appropriate but uncooperative during the duration of his stay. He had no interest in medications, non-pharmaceutical options or outpatient supports. He denied suicidal ideation and any intent to commit suicide throughout his stay. As his hold was up and he requested to discharge, this was arranged.        Treatment Team Progress Note:   The patient's care was discussed with the treatment team and chart notes were reviewed.    Plan:  Patient is discharging at the recommendation of the treatment team.     At time of discharge, there is no evidence that patient is in immediate danger of self or others.          Diagnoses:   Schizoaffective Disorder, bipolar type - per history  Methamphetamine Abuse Disorder, per history         Labs:   No results found for this or any previous visit (from the past 24 hour(s)).           Discharge Medications:     Current Discharge Medication List      CONTINUE these medications which have NOT CHANGED    Details   bictegravir-emtricitabine-tenofovir (BIKTARVY) -25 MG per tablet Take 1 tablet by mouth daily          STOP taking these medications       ARIPiprazole (ABILIFY) 5 MG tablet Comments:   Reason for Stopping:         FLUoxetine (PROZAC) 20 MG capsule Comments:   Reason for Stopping:                    Psychiatric Examination:   Appearance:  awake, alert and adequately groomed  Attitude:  guarded, but cooperative  Eye Contact:  fair  Mood:  \"Ok\"  Affect:  intensity is blunted  Speech:  clear, coherent but minimal  Psychomotor Behavior:  no evidence of tardive dyskinesia, dystonia, or tics  Thought Process:  logical, linear and goal oriented  Associations:  no loose associations  Thought Content:  no evidence of suicidal " ideation or homicidal ideation and no evidence of psychotic thought  Insight:  limited  Judgment:  limited  Oriented to:  time, person, and place  Attention Span and Concentration:  intact  Recent and Remote Memory:  intact  Fund of Knowledge: not assessed  Muscle Strength and Tone: normal  Gait and Station: Normal         Discharge Plan:   Discharge home. Transportation arranged.     Psychiatry Follow-up:      Guardian- Hillary Miranda - [phone]     [facility]  Medication Management & Therapy-As arranged  [address] [place] [id number]  P: [phone]     [facility]- Urgent Care for adult mental health  24/7 Mobile Crisis Team and Phone Support also  [address] [place] [address]  [phone]      Attestation:  The patient has been seen and evaluated by me,  CIARAN Young CNP         Discharge Services Provided:   35  minutes spent on discharge services, including:  Final examination of patient.  Review and discussion of Hospital stay.  Instructions for continued outpatient care/goals.  Preparation of discharge records.

## 2021-01-28 NOTE — PLAN OF CARE
Problem: Behavioral Health Plan of Care  Goal: Patient-Specific Goal (Individualization)  Description: Cooperate with treatment team recommendations including medications.   Sleep at least 6 hours nightly.  Eat 75% meals.   Attend at least 75% of programming.   Outcome: No Change  Note:      A&O, VSS denies pain. Denies all criteria including: SI, SIB, HI or hallucinations.  Remains withdrawn and isolative.pt comes out of room for dinner.   Gives head nod or y/n answer to assessment questions. States he will go to his Aunt Season's house to live with her tomorrow. Voluntary rights explained to pt as his hold is up at midnight.     Problem: Suicide Risk  Goal: Absence of Self-Harm  Outcome: No Change     Problem: Suicidal Behavior  Goal: Suicidal Behavior is Absent or Managed  Outcome: No Change    Face to face end of shift report communicated to oncoming shift     Maria Elena Conley RN  1/27/2021  9:08 PM

## 2021-01-28 NOTE — PLAN OF CARE
"  Problem: Behavioral Health Plan of Care  Goal: Patient-Specific Goal (Individualization)  Description: Cooperate with treatment team recommendations including medications.   Sleep at least 6 hours nightly.  Eat 75% meals.   Attend at least 75% of programming.   Outcome: Improving     Problem: Suicide Risk  Goal: Absence of Self-Harm  Outcome: Improving     Problem: Suicidal Behavior  Goal: Suicidal Behavior is Absent or Managed  Outcome: Improving      Face to face shift report received from RN. Rounding completed, pt observed. Client slept for 6 hours last night. He ate breakfast and took prescribed medications. Client has attended no unit programming. He has made no suicidal/self harm verbalizations or gestures. Client is tentatively scheduled for discharge at 1300 today.    Discharge Note    Patient \"Discharged to home\" on 1/28/2021 12:10 PM via Taxi accompanied by Staff to Taxi.     Patient informed of discharge instructions in AVS. patient verbalizes understanding and denies having any questions pertaining to AVS. Patient stable at time of discharge. Patient denies SI, HI, and thoughts of self harm at time of discharge. All personal belongings returned to patient. Psych evaluation, history and physical, AVS, and discharge summary available to next level of care.   Kolby Paiz RN  1/28/2021  12:25 PM    "

## 2021-01-28 NOTE — PLAN OF CARE
"Did not receive any paperwork to verify that pt has a guardian.     Arranged a Medicaid \"MTM\" ride for pt. [employer] will be picking pt up at 1:00pm at the [facility] [facility]tr[facility] and will call upon arrival.     [employer] called and stated they will be arriving now around 12:15. Notified NP and nursing staff.    Pt is discharging at the recommendation of the treatment team. Pt is discharging to [facility] in [place] transported by [employer]-via Medicaid ride- around noon. Pt denies having any thoughts of hurting themself or anyone else. Pt declined wanting follow-up arranged and stated they planned on arranging themselves when they got home. Put MH resources in pt's AVS and let them know of this.  "

## 2021-01-28 NOTE — PLAN OF CARE
Face to face received from Maria Elena DOMINGUEZ RN.   Rounding completed, pt observed in bed at this time appears to be sleeping.     15 minute and PRN checks completed this shift.   No concerns and/or complaints this shift.     Pt asleep 6 hours this shift.     Problem: Behavioral Health Plan of Care  Goal: Patient-Specific Goal (Individualization)  Description: Cooperate with treatment team recommendations including medications.   Sleep at least 6 hours nightly.  Eat 75% meals.   Attend at least 75% of programming.   Outcome: No Change  Note:        Problem: Suicide Risk  Goal: Absence of Self-Harm  Outcome: No Change     Problem: Suicidal Behavior  Goal: Suicidal Behavior is Absent or Managed  Outcome: No Change    Face to face end of shift report  to be communicated to oncoming RN.     Gauri Gautam RN  1/28/2021  3:38 AM

## 2021-06-19 ENCOUNTER — HOSPITAL ENCOUNTER (EMERGENCY)
Facility: CLINIC | Age: 21
Discharge: HOME | End: 2021-06-20
Attending: EMERGENCY MEDICINE | Admitting: EMERGENCY MEDICINE
Payer: COMMERCIAL

## 2021-06-19 DIAGNOSIS — A51.2: ICD-10-CM

## 2021-06-19 DIAGNOSIS — Z76.0 ENCOUNTER FOR MEDICATION REFILL: ICD-10-CM

## 2021-06-19 DIAGNOSIS — Z20.2 EXPOSURE TO SYPHILIS: ICD-10-CM

## 2021-06-19 DIAGNOSIS — F15.10 METHAMPHETAMINE ABUSE (H): ICD-10-CM

## 2021-06-19 PROCEDURE — 99284 EMERGENCY DEPT VISIT MOD MDM: CPT | Performed by: EMERGENCY MEDICINE

## 2021-06-19 PROCEDURE — 86780 TREPONEMA PALLIDUM: CPT | Performed by: EMERGENCY MEDICINE

## 2021-06-19 RX ORDER — DOXYCYCLINE 100 MG/1
100 CAPSULE ORAL ONCE
Status: COMPLETED | OUTPATIENT
Start: 2021-06-19 | End: 2021-06-20

## 2021-06-20 VITALS
BODY MASS INDEX: 23.19 KG/M2 | DIASTOLIC BLOOD PRESSURE: 95 MMHG | TEMPERATURE: 99.1 F | SYSTOLIC BLOOD PRESSURE: 143 MMHG | HEART RATE: 105 BPM | WEIGHT: 161.6 LBS | OXYGEN SATURATION: 100 % | RESPIRATION RATE: 16 BRPM

## 2021-06-20 LAB
ALBUMIN SERPL-MCNC: 4.2 G/DL (ref 3.4–5)
ALBUMIN UR-MCNC: 20 MG/DL
ALP SERPL-CCNC: 86 U/L (ref 40–150)
ALT SERPL W P-5'-P-CCNC: 26 U/L (ref 0–70)
AMORPH CRY #/AREA URNS HPF: ABNORMAL /HPF
AMPHETAMINES UR QL SCN: POSITIVE
ANION GAP SERPL CALCULATED.3IONS-SCNC: 10 MMOL/L (ref 3–14)
APPEARANCE UR: CLEAR
AST SERPL W P-5'-P-CCNC: 16 U/L (ref 0–45)
BACTERIA #/AREA URNS HPF: ABNORMAL /HPF
BARBITURATES UR QL: NEGATIVE
BASOPHILS # BLD AUTO: 0 10E9/L (ref 0–0.2)
BASOPHILS NFR BLD AUTO: 0.3 %
BENZODIAZ UR QL: NEGATIVE
BILIRUB SERPL-MCNC: 1.3 MG/DL (ref 0.2–1.3)
BILIRUB UR QL STRIP: NEGATIVE
BUN SERPL-MCNC: 18 MG/DL (ref 7–30)
CALCIUM SERPL-MCNC: 9.7 MG/DL (ref 8.5–10.1)
CANNABINOIDS UR QL SCN: POSITIVE
CHLORIDE SERPL-SCNC: 100 MMOL/L (ref 94–109)
CO2 SERPL-SCNC: 25 MMOL/L (ref 20–32)
COCAINE UR QL: NEGATIVE
COLOR UR AUTO: YELLOW
CREAT SERPL-MCNC: 1.07 MG/DL (ref 0.66–1.25)
DIFFERENTIAL METHOD BLD: NORMAL
EOSINOPHIL # BLD AUTO: 0.1 10E9/L (ref 0–0.7)
EOSINOPHIL NFR BLD AUTO: 0.8 %
ERYTHROCYTE [DISTWIDTH] IN BLOOD BY AUTOMATED COUNT: 13.1 % (ref 10–15)
ETHANOL UR QL SCN: NEGATIVE
GFR SERPL CREATININE-BSD FRML MDRD: >90 ML/MIN/{1.73_M2}
GLUCOSE SERPL-MCNC: 100 MG/DL (ref 70–99)
GLUCOSE UR STRIP-MCNC: NEGATIVE MG/DL
GRAM STN SPEC: ABNORMAL
HCT VFR BLD AUTO: 43.4 % (ref 40–53)
HGB BLD-MCNC: 14.6 G/DL (ref 13.3–17.7)
HGB UR QL STRIP: NEGATIVE
HYALINE CASTS #/AREA URNS LPF: 16 /LPF (ref 0–2)
IMM GRANULOCYTES # BLD: 0 10E9/L (ref 0–0.4)
IMM GRANULOCYTES NFR BLD: 0.3 %
KETONES UR STRIP-MCNC: 10 MG/DL
LEUKOCYTE ESTERASE UR QL STRIP: NEGATIVE
LYMPHOCYTES # BLD AUTO: 1.4 10E9/L (ref 0.8–5.3)
LYMPHOCYTES NFR BLD AUTO: 15.5 %
Lab: ABNORMAL
MCH RBC QN AUTO: 28.1 PG (ref 26.5–33)
MCHC RBC AUTO-ENTMCNC: 33.6 G/DL (ref 31.5–36.5)
MCV RBC AUTO: 84 FL (ref 78–100)
MONOCYTES # BLD AUTO: 0.5 10E9/L (ref 0–1.3)
MONOCYTES NFR BLD AUTO: 5.9 %
MUCOUS THREADS #/AREA URNS LPF: PRESENT /LPF
NEUTROPHILS # BLD AUTO: 7 10E9/L (ref 1.6–8.3)
NEUTROPHILS NFR BLD AUTO: 77.2 %
NITRATE UR QL: NEGATIVE
NRBC # BLD AUTO: 0 10*3/UL
NRBC BLD AUTO-RTO: 0 /100
OPIATES UR QL SCN: NEGATIVE
PH UR STRIP: 6 PH (ref 5–7)
PLATELET # BLD AUTO: 293 10E9/L (ref 150–450)
POTASSIUM SERPL-SCNC: 3.9 MMOL/L (ref 3.4–5.3)
PROT SERPL-MCNC: 8.8 G/DL (ref 6.8–8.8)
RBC # BLD AUTO: 5.2 10E12/L (ref 4.4–5.9)
RBC #/AREA URNS AUTO: 3 /HPF (ref 0–2)
SODIUM SERPL-SCNC: 135 MMOL/L (ref 133–144)
SOURCE: ABNORMAL
SP GR UR STRIP: 1.03 (ref 1–1.03)
SPECIMEN SOURCE: ABNORMAL
SQUAMOUS #/AREA URNS AUTO: 0 /HPF (ref 0–1)
T PALLIDUM AB SER QL: NONREACTIVE
TRANS CELLS #/AREA URNS HPF: 1 /HPF (ref 0–1)
UROBILINOGEN UR STRIP-MCNC: 2 MG/DL (ref 0–2)
WBC # BLD AUTO: 9.1 10E9/L (ref 4–11)
WBC #/AREA URNS AUTO: 3 /HPF (ref 0–5)

## 2021-06-20 PROCEDURE — 87591 N.GONORRHOEAE DNA AMP PROB: CPT | Performed by: EMERGENCY MEDICINE

## 2021-06-20 PROCEDURE — 87077 CULTURE AEROBIC IDENTIFY: CPT | Performed by: EMERGENCY MEDICINE

## 2021-06-20 PROCEDURE — 85025 COMPLETE CBC W/AUTO DIFF WBC: CPT | Performed by: EMERGENCY MEDICINE

## 2021-06-20 PROCEDURE — 87491 CHLMYD TRACH DNA AMP PROBE: CPT | Performed by: EMERGENCY MEDICINE

## 2021-06-20 PROCEDURE — 81001 URINALYSIS AUTO W/SCOPE: CPT | Performed by: EMERGENCY MEDICINE

## 2021-06-20 PROCEDURE — 87186 SC STD MICRODIL/AGAR DIL: CPT | Performed by: EMERGENCY MEDICINE

## 2021-06-20 PROCEDURE — 250N000013 HC RX MED GY IP 250 OP 250 PS 637: Performed by: EMERGENCY MEDICINE

## 2021-06-20 PROCEDURE — 80053 COMPREHEN METABOLIC PANEL: CPT | Performed by: EMERGENCY MEDICINE

## 2021-06-20 PROCEDURE — 80320 DRUG SCREEN QUANTALCOHOLS: CPT | Performed by: EMERGENCY MEDICINE

## 2021-06-20 PROCEDURE — 87070 CULTURE OTHR SPECIMN AEROBIC: CPT | Performed by: EMERGENCY MEDICINE

## 2021-06-20 PROCEDURE — 250N000011 HC RX IP 250 OP 636: Performed by: EMERGENCY MEDICINE

## 2021-06-20 PROCEDURE — 80307 DRUG TEST PRSMV CHEM ANLYZR: CPT | Performed by: EMERGENCY MEDICINE

## 2021-06-20 PROCEDURE — 96372 THER/PROPH/DIAG INJ SC/IM: CPT | Performed by: EMERGENCY MEDICINE

## 2021-06-20 PROCEDURE — 87205 SMEAR GRAM STAIN: CPT | Performed by: EMERGENCY MEDICINE

## 2021-06-20 RX ORDER — DOXYCYCLINE 100 MG/1
100 CAPSULE ORAL 2 TIMES DAILY
Qty: 20 CAPSULE | Refills: 0 | Status: SHIPPED | OUTPATIENT
Start: 2021-06-20 | End: 2021-06-30

## 2021-06-20 RX ORDER — BICTEGRAVIR SODIUM, EMTRICITABINE, AND TENOFOVIR ALAFENAMIDE FUMARATE 50; 200; 25 MG/1; MG/1; MG/1
1 TABLET ORAL DAILY
Qty: 7 TABLET | Refills: 0 | Status: SHIPPED | OUTPATIENT
Start: 2021-06-20

## 2021-06-20 RX ADMIN — PENICILLIN G BENZATHINE 2.4 MILLION UNITS: 2400000 INJECTION, SUSPENSION INTRAMUSCULAR at 01:00

## 2021-06-20 RX ADMIN — DOXYCYCLINE 100 MG: 100 CAPSULE ORAL at 00:23

## 2021-06-20 ASSESSMENT — ENCOUNTER SYMPTOMS
HEMATURIA: 0
DYSURIA: 0

## 2021-06-20 NOTE — ED NOTES
Patient attempted to void multiple times. Patient requests straight cath. Output less than 100ml of dark urine. Patient taking oral fluids at this time.

## 2021-06-20 NOTE — ED NOTES
"Patient has had multiple attempts to void. Patient unable to void at this time. Water given to patient. Patient states it is \"too loud.\"  "

## 2021-06-20 NOTE — DISCHARGE INSTRUCTIONS
Be sure to take the full 10 days of doxycycline.  Please return the emergency department if you have fever, vomiting, if you cannot take your medications, have other concerns.  You should avoid any sexual contact with anyone until you are fully treated and your partners are treated.  Do not initiate sexual contact until you are cleared to do so by your infectious disease provider.    Please keep your appointment to follow up with Infectious Disease Clinic (phone: [phone]) in 5 days.

## 2021-06-20 NOTE — ED PROVIDER NOTES
[facility] EMERGENCY DEPARTMENT ([facility])  6/19/21     History     Chief Complaint   Patient presents with     Exposure to STD     Boil in groin area and mucus discharge.      The history is provided by the patient and medical records.     Rajiv Covington is a 21 year old male with a past medical history significant for HIV, homelessness, and substance abuse who presents here to the Emergency Department for evaluation of possible syphilis.  Patient reports he has a known exposure to syphilis.  He states his first contact was at the beginning of the year, and his most recent contact was earlier today.  Patient reports he has a swollen area in his right groin that he noticed a few days ago.  He states that area is sometimes uncomfortable but not really painful.  Patient denies penile drainage.  He denies dysuria or hematuria.  Patient notes he has not had recent refills of his Biktarvy due to insurance issues.  Of note, patient endorses doing meth today.     Past Medical History  History reviewed. No pertinent past medical history.  History reviewed. No pertinent surgical history.  bictegravir-emtricitabine-tenofovir (BIKTARVY) -25 MG per tablet  bictegravir-emtricitabine-tenofovir (BIKTARVY) -25 MG per tablet  doxycycline hyclate (VIBRAMYCIN) 100 MG capsule      No Known Allergies  Family History  History reviewed. No pertinent family history.  Social History   Social History     Tobacco Use     Smoking status: Current Every Day Smoker     Smokeless tobacco: Never Used   Substance Use Topics     Alcohol use: Not Currently     Drug use: Yes     Types: Methamphetamines, Marijuana      Past medical history, past surgical history, medications, allergies, family history, and social history were reviewed with the patient. No additional pertinent items.       Review of Systems   Genitourinary: Negative for discharge, dysuria, hematuria and penile pain.        Pos for swelling in L groin    All other systems reviewed and are negative.    A complete review of systems was performed with pertinent positives and negatives noted in the HPI, and all other systems negative.    Physical Exam   BP: (!) 152/102  Pulse: 153  Temp: 99.8  F (37.7  C)  Resp: 16  Weight: 73.3 kg (161 lb 9.6 oz)  SpO2: 99 %  Physical Exam  GEN:  Well developed, no acute distress  HEENT:  EOMI, Mucous membranes are moist.   Cardio: Regular tachycardia, no murmur, radial pulses equal bilaterally  PULM:  Lungs clear, good air movement, no wheezes, rales,   Abd:  Soft, normal bowel sounds, no focal tenderness  Musculoskeletal:  normal range of motion, no lower extremity swelling or calf tenderness   exam: Exam done with the nurse present.  The right inguinal area has a smooth swollen area under the skin that I suspect is a lymph node.  It is not tender, there is are no skin changes, no erythema, no open sores, no drainage.  No drainage from the penis, no tenderness of the penis or scrotum.  No lesions seen on the scrotum or penis.  The right buttock has a 1 cm open ulcer with purulent drainage and surrounding erythema and induration.  This area is surprisingly not very tender to palpation.  Neuro:  Alert and oriented X3, Follows commands, moving all extremities spontaneously   Skin:  Warm, dry   ED Course     11:28 PM  The patient was seen and examined by Shadia Trejo MD in Room ED03.    Procedures           Patient reports suspected exposure to syphilis, so suspicion for primary syphilis is quite high.  I suspect the lesion on the right buttock is likely a chancre.  Patient will be given IM penicillin to treat for suspected primary syphilis.  The area surrounding the chancre has significant induration and erythema and I suspect there is an associated cellulitis.  He will be treated additionally with doxycycline for this.  Patient was tachycardic initially in the ED and this may be due to his use of meth.  We will do laboratory  work-up for the tachycardia including CBC, comprehensive metabolic panel, urine tox screen.  At the time of shift change, the labs are all pending.  Treponema antibody test with reflex to RPR has been ordered, will also do PCR testing for chlamydia and gonorrhea.  Patient has follow-up scheduled with infectious disease for later this week.     No results found for any visits on 06/19/21.  Medications   penicillin G benzathine (BICILLIN L-A) injection 2.4 Million Units (has no administration in time range)   doxycycline hyclate (VIBRAMYCIN) capsule 100 mg (100 mg Oral Given 6/20/21 0023)        Assessments & Plan (with Medical Decision Making)   Patient presents with concerns for possible syphilis.  I agree that he most likely does have primary syphilis.  He was treated for this with IM penicillin G, Bicillin LA.  Patient will be given discharge instructions for syphilis and advised not to have any sexual contact with anyone until he is fully treated and his partners are treated.  We will treat for cellulitis as well.  Patient was advised to return if he has fever, vomiting, any other concerns.  He will follow up with infectious disease as already scheduled for this Thursday.  Patient was also given a refill for 1 week of his Biktarvy since he is out of it.    I have reviewed the nursing notes. I have reviewed the findings, diagnosis, plan and need for follow up with the patient.    New Prescriptions    BICTEGRAVIR-EMTRICITABINE-TENOFOVIR (BIKTARVY) -25 MG PER TABLET    Take 1 tablet by mouth daily    DOXYCYCLINE HYCLATE (VIBRAMYCIN) 100 MG CAPSULE    Take 1 capsule (100 mg) by mouth 2 times daily for 10 days       Final diagnoses:   Exposure to syphilis   Primary syphilis of other sites   Encounter for medication refill   Methamphetamine abuse (H)   Molly SONG, am serving as a trained medical scribe to document services personally performed by Shadia Trejo MD, based on the provider's  statements to me.     I, Shadia Trejo MD, was physically present and have reviewed and verified the accuracy of this note documented by Molly Rosales.     --  Shadia Trejo MD  [facility] EMERGENCY DEPARTMENT  6/19/2021     Shadia Trejo MD  06/20/21 0019       Shadia Trejo MD  06/20/21 0037

## 2021-06-21 ENCOUNTER — TELEPHONE (OUTPATIENT)
Dept: EMERGENCY MEDICINE | Facility: CLINIC | Age: 21
End: 2021-06-21

## 2021-06-21 LAB
C TRACH DNA SPEC QL NAA+PROBE: NEGATIVE
N GONORRHOEA DNA SPEC QL NAA+PROBE: POSITIVE
SPECIMEN SOURCE: ABNORMAL
SPECIMEN SOURCE: NORMAL

## 2021-06-21 NOTE — LETTER
June 24, 2021        Rajiv Covington  [address]    [place] [address]          Dear Rajiv Covington:    You were seen in the [facility] Emergency Department on June 24, 2021 and we have been unable to reach you by phone, so we are sending you this letter.     It is important that you call [facility] Emergency Department lab result nurse at [phone], as we have information to relay to you AND/OR we MAY have to make some changes in your treatment.    Best time to call back is between 9 a.m. and 5:30 p.m, 7 days a week.      Sincerely,     [facility] Emergency Department Lab Result RN  [phone]

## 2021-06-21 NOTE — TELEPHONE ENCOUNTER
[facility] Emergency Department Lab result notification [Adult-Male]    [place] ED lab result protocol used  N. Gonorrhea    Reason for call  Notify of lab results, assess symptoms,  review ED providers recommendations/discharge instructions (if necessary) and advise per ED lab result f/u protocol    Lab Result (including Rx patient on, if applicable)  Final N. Gonorrhoeae PCR is POSITIVE.   Patient was treated appropriately in the ED [Yes or No]:  No       [facility] Emergency Dept discharge antibiotic prescribed [if applicable]: Doxycycline 100 mg PO tablet, 1 tablet (100 mg) by mouth 2 times daily for 10 days  Recommendations in treatment per [facility] ED Lab Result N. Gonorrhea protocol    Information table from Emergency Dept Provider visit on 6/19-6/20/21  Symptoms reported at ED visit (Chief complaint, HPI) Exposure to STD        Boil in groin area and mucus discharge.       The history is provided by the patient and medical records.      Rajiv Covington is a 21 year old male with a past medical history significant for HIV, homelessness, and substance abuse who presents here to the Emergency Department for evaluation of possible syphilis.  Patient reports he has a known exposure to syphilis.  He states his first contact was at the beginning of the year, and his most recent contact was earlier today.  Patient reports he has a swollen area in his right groin that he noticed a few days ago.  He states that area is sometimes uncomfortable but not really painful.  Patient denies penile drainage.  He denies dysuria or hematuria.  Patient notes he has not had recent refills of his Biktarvy due to insurance issues.  Of note, patient endorses doing meth today.      Significant Medical hx, if applicable (i.e. CKD, diabetes) None   Allergies No Known Allergies   Weight, if applicable Wt Readings from Last 2 Encounters:   06/19/21 73.3 kg (161 lb 9.6 oz)      Coumadin/Warfarin [Yes  /No] No   Creatinine Level (mg/dl) Creatinine   Date Value Ref Range Status   06/20/2021 1.07 0.66 - 1.25 mg/dL Final      Creatinine clearance (ml/min), if applicable Serum creatinine: 1.07 mg/dL 06/20/21 0058  Estimated creatinine clearance: 113.2 mL/min   ED providers Impression and Plan (applicable information) Patient reports suspected exposure to syphilis, so suspicion for primary syphilis is quite high.  I suspect the lesion on the right buttock is likely a chancre.  Patient will be given IM penicillin to treat for suspected primary syphilis.  The area surrounding the chancre has significant induration and erythema and I suspect there is an associated cellulitis.  He will be treated additionally with doxycycline for this.  Patient was tachycardic initially in the ED and this may be due to his use of meth.  We will do laboratory work-up for the tachycardia including CBC, comprehensive metabolic panel, urine tox screen.  At the time of shift change, the labs are all pending.  Treponema antibody test with reflex to RPR has been ordered, will also do PCR testing for chlamydia and gonorrhea.  Patient has follow-up scheduled with infectious disease for later this week.  Patient presents with concerns for possible syphilis.  I agree that he most likely does have primary syphilis.  He was treated for this with IM penicillin G, Bicillin LA.  Patient will be given discharge instructions for syphilis and advised not to have any sexual contact with anyone until he is fully treated and his partners are treated.  We will treat for cellulitis as well.  Patient was advised to return if he has fever, vomiting, any other concerns.  He will follow up with infectious disease as already scheduled for this Thursday.  Patient was also given a refill for 1 week of his Biktarvy since he is out of it.   ED diagnosis Exposure to syphilis   Primary syphilis of other sites   Encounter for medication refill   Methamphetamine abuse (H)     "  ED provider Shadia Trejo MD RN Assessment (Patient s current Symptoms), include time called.  [Insert Left message here if message left]  12:59PM: Spoke with patient. He states he is doing fine.     RN Recommendations/Instructions per [place] ED lab result protocol  Patient notified of lab result and treatment recommendations.   RN reviewed information about   N. Gonorrhea from protocol RN action.     Advised to return to ED today for treatment for Gonorrhea or to make a clinic appointment within 24 hours to get treatment. Offered to assist the patient with a clinic appointment, he declines \"I can do that.\"     STD Patient Instructions:    We recommend that you contact any recent sexual partners within the last 2 months and have them evaluated by a physician.    Avoid sexual activity  until both you and your partner(s) have completed all antibiotic medications.    We advise that you consider following up with your PCP at approximately 3 months for retesting to be sure the infection has cleared.    The patient has an appointment scheduled with the infectious disease provider for 6/24.     The patient is comfortable with the information given and has no further questions.      Please Contact your PCP clinic or return to the Emergency department if your:    Symptoms return.    Symptoms worsen or other concerning symptom's.    PCP follow-up Questions asked: YES       Grace Ortez RN  [facility] [employer] [place]  Emergency Dept Lab Result RN  Ph# [phone]     Copy of Lab result      "

## 2021-06-22 LAB
BACTERIA SPEC CULT: ABNORMAL
Lab: ABNORMAL
SPECIMEN SOURCE: ABNORMAL

## 2021-06-22 NOTE — TELEPHONE ENCOUNTER
[facility] Emergency Department/Urgent Care Lab result notification:    [place] ED lab result protocol used  Culture    Reason for call  Notify of lab results, assess symptoms,  review ED providers recommendations/discharge instructions (if necessary) and advise per ED lab result f/u protocol    Lab Result   Final Wound culture (Buttock) report on 6/22/21.  [facility] Emergency Dept discharge antibiotic prescribed:  Doxycycline 100 mg PO tablet, 1 tablet (100 mg) by mouth 2 times daily for 10 days  #1. Bacteria, Heavy growth   Methicillin resistant Staphylococcus aureus (MRSA) , which is [SUSCEPTIBLE] to antibiotic   Incision and Drainage performed in Emergency Dept [Yes / No]: No  Recommendations in treatment per [facility] ED lab result Culture protocol.  Information table from Emergency Dept Provider visit on  6/19-6/20/21  Symptoms reported at ED visit (Chief complaint, HPI) See below   ED providers Impression and Plan (applicable information) See below   Miscellaneous information na     RN Assessment (Patient s current Symptoms), include time called.  [Insert Left message here if message left]  1:22PM: Left voicemail message requesting a call back to [facility] ED Lab Result RN at [phone].  RN is available every day between 9 a.m. and 5:30 p.m.    Grace Ortez RN  [facility]  Emergency Dept Lab Result RN  Ph# [phone]     Copy of Lab result   Wound Culture Aerobic Bacterial  Order: [id number]  Status:  Final result   Visible to patient:  Yes (MyChart) Dx:  Exposure to syphilis  Specimen Information: Buttock        (important suggestion)  Newer results are available. Click to view them now.   Component 2d ago   Specimen Description Buttock    Special Requests Specimen collected in eSwab transport (white cap)    Culture Micro Abnormal   Heavy growth   Methicillin resistant Staphylococcus aureus (MRSA)     [facility]    Susceptibility     Methicillin resistant staphylococcus aureus (mrsa)     CAMERON     CLINDAMYCIN >=8 ug/mL Resistant     ERYTHROMYCIN >=8 ug/mL Resistant     GENTAMICIN <=0.5 ug/mL Sensitive     LINEZOLID 2 ug/mL Sensitive     OXACILLIN >=4 ug/mL Resistant     TETRACYCLINE <=1 ug/mL Sensitive     Trimethoprim/Sulfa <=0.5/9.5 u... Sensitive     VANCOMYCIN 1 ug/mL Sensitive                 Specimen Collected: 06/20/21 12:28 AM Last Resulted: 06/22/21  7:38 AM

## 2021-06-24 NOTE — TELEPHONE ENCOUNTER
[facility] Emergency Department Lab result notification:    Reason for Letter being mailed out:      Patient treated in the Emergency Dept appropriate but they have NOT be notified about the Positive lab results.  Lab information to be reviewed with Patient or Parent    Unable to reach via telephone so letter sent with a message requesting a call back to [phone] between 9 a.m. and 5:30 p.m., 7 days a week for patient's ED/UC lab results.   Lab result:  Final Wound culture (Buttock) report on 6/22/21.  [facility] Emergency Dept discharge antibiotic prescribed:  Doxycycline 100 mg PO tablet, 1 tablet (100 mg) by mouth 2 times daily for 10 days  #1. Bacteria, Heavy growth Methicillin resistant Staphylococcus aureus (MRSA) , which is [SUSCEPTIBLE] to antibiotic   Incision and Drainage performed in Emergency Dept [Yes / No]: No  Recommendations in treatment per [facility] ED lab result Culture protocol..    Miscellaneous information: VICTORIA Harding RN  [facility] [employer] [place]  Emergency Dept Lab Result RN  Ph# [phone]

## 2021-07-24 ENCOUNTER — HEALTH MAINTENANCE LETTER (OUTPATIENT)
Age: 21
End: 2021-07-24

## 2021-09-18 ENCOUNTER — HEALTH MAINTENANCE LETTER (OUTPATIENT)
Age: 21
End: 2021-09-18

## 2021-10-12 NOTE — TELEPHONE ENCOUNTER
[facility] Emergency Department/Urgent Care Lab result notification     Patient/parent Name  Rajiv NOVAK Assessment (Patient s current Symptoms), include time called.  [Insert Left message here if message left]  Buttock sore has healed    Lab result (if applicable):  Final Wound culture (Buttock) report on 6/22/21.  [facility] Emergency Dept discharge antibiotic prescribed:  Doxycycline 100 mg PO tablet, 1 tablet (100 mg) by mouth 2 times daily for 10 days  #1. Bacteria, Heavy growth Methicillin resistant Staphylococcus aureus (MRSA) , which is [SUSCEPTIBLE] to antibiotic   Incision and Drainage performed in Emergency Dept [Yes / No]: No  Recommendations in treatment per [facility] ED lab result Culture protocol..     RN Recommendations/Instructions per [place] ED lab result protocol  Patient notified of lab result and treatment recommendations.  Reviewed information regarding MRSA       Giancarlo Harding RN  [facility] [employer] [place]  Emergency Dept Lab Result RN  Ph# [phone]

## 2022-08-20 ENCOUNTER — HEALTH MAINTENANCE LETTER (OUTPATIENT)
Age: 22
End: 2022-08-20

## 2022-11-19 ENCOUNTER — HEALTH MAINTENANCE LETTER (OUTPATIENT)
Age: 22
End: 2022-11-19

## 2023-09-10 ENCOUNTER — HEALTH MAINTENANCE LETTER (OUTPATIENT)
Age: 23
End: 2023-09-10